# Patient Record
Sex: FEMALE | Race: WHITE | URBAN - METROPOLITAN AREA
[De-identification: names, ages, dates, MRNs, and addresses within clinical notes are randomized per-mention and may not be internally consistent; named-entity substitution may affect disease eponyms.]

---

## 2022-11-17 ENCOUNTER — HOSPITAL ENCOUNTER (EMERGENCY)
Age: 77
Discharge: HOME | End: 2022-11-17
Attending: EMERGENCY MEDICINE
Payer: MEDICARE

## 2022-11-17 VITALS
BODY MASS INDEX: 40.66 KG/M2 | OXYGEN SATURATION: 97 % | SYSTOLIC BLOOD PRESSURE: 137 MMHG | TEMPERATURE: 97.9 F | WEIGHT: 229.5 LBS | RESPIRATION RATE: 16 BRPM | HEART RATE: 108 BPM | HEIGHT: 63 IN | DIASTOLIC BLOOD PRESSURE: 64 MMHG

## 2022-11-17 DIAGNOSIS — D84.821 IMMUNOSUPPRESSION DUE TO DRUG THERAPY (HCC): Primary | ICD-10-CM

## 2022-11-17 DIAGNOSIS — Z79.899 IMMUNOSUPPRESSION DUE TO DRUG THERAPY (HCC): Primary | ICD-10-CM

## 2022-11-17 PROCEDURE — 6370000000 HC RX 637 (ALT 250 FOR IP): Performed by: EMERGENCY MEDICINE

## 2022-11-17 PROCEDURE — 99283 EMERGENCY DEPT VISIT LOW MDM: CPT

## 2022-11-17 RX ORDER — SULFAMETHOXAZOLE AND TRIMETHOPRIM 800; 160 MG/1; MG/1
1 TABLET ORAL NIGHTLY
Qty: 1 TABLET | Refills: 0 | Status: SHIPPED | OUTPATIENT
Start: 2022-11-17 | End: 2022-11-18

## 2022-11-17 RX ORDER — SULFAMETHOXAZOLE AND TRIMETHOPRIM 800; 160 MG/1; MG/1
1 TABLET ORAL ONCE
Status: COMPLETED | OUTPATIENT
Start: 2022-11-17 | End: 2022-11-17

## 2022-11-17 RX ADMIN — SULFAMETHOXAZOLE AND TRIMETHOPRIM 1 TABLET: 800; 160 TABLET ORAL at 21:42

## 2022-11-17 ASSESSMENT — LIFESTYLE VARIABLES: HOW OFTEN DO YOU HAVE A DRINK CONTAINING ALCOHOL: NEVER

## 2022-11-18 NOTE — DISCHARGE INSTRUCTIONS
Ms. Trell Ying was treated with a dose of Sulfamethoxazole-Trimethoprim in the emergency department. She has been provided with a prescription for another dose of this medication for tomorrow. It's fairly reasonable for her to be on PJP pneumonia prophylaxis as she is elderly, chronically ill, she appears to currently be prescribed 20 or more milligrams of Prednisone for > 1 month, and she is on another immunosuppressive medication (Methotrexate). However, this is a medication that can have significant side effects. Her primary doctor really needs to be the one prescribing it and monitoring her response to this therapy going forward. Be sure to contact her primary care doctor to arrange for a follow up visit and to discuss any ongoing prescribing of this medication. If Ms. Palomino develops any symptoms or  issues after leaving the hospital she is welcome back here for reevaluation at any time 24/7!

## 2022-11-19 NOTE — ED PROVIDER NOTES
[address] [place] [address] EMERGENCY DEPARTMENT    Name: Franck Hdez : 1945 MRN: [id number] Date of Service: 2022    /64   Pulse (!) 108   Temp 97.9 °F (36.6 °C)   Resp 16   Ht 5' 3\" (1.6 m)   Wt 229 lb 8 oz (104.1 kg)   SpO2 97%   BMI 40.65 kg/m²     CC: need for prescription medication    HPI: this patient is a 78 y.o. female presenting to the ED from [facility] via EMS. SNF staff called [phone] and requested for Ms. Palomino to be brought to the emergency department this evening as \"her doctor said that she needs to be prescribed something for PJP pneumnia prevention. \" We called to confirm with [facility] staff that this is Ms. Palomino's only acute issue or need. On arrival here Ms. Palomino is unable to provide additional H&P details as she has significant speech, communication, and cognitive deficits related to a prior hemorrhagic stroke. EHR review from [facility] healthcare systems shows that she was recently diagnosed with bullous pemphigoid and she is currently being treated with high-dose, daily Prednisone along with intermittent Methotrexate.  _____________________________________________________________________    Past Medical History:   Diagnosis Date    Class 3 severe obesity in adult [facility])     Hemorrhagic stroke ([facility] [address]     Hyperlipidemia     Hypertension     TIA (transient ischemic attack)     Type 2 diabetes mellitus ([facility] [address]        Past Surgical History:   Procedure Laterality Date    HYSTERECTOMY (CERVIX STATUS UNKNOWN)      TONSILLECTOMY         Social History     Tobacco Use    Smoking status: Unknown   Vaping Use    Vaping Use: Never used   Substance Use Topics    Alcohol use: Not Currently    Drug use: Not Currently       Family History   Problem Relation Age of Onset    Hypothyroidism Mother     Diabetes Mother     Elevated Lipids Mother      _____________________________________________________________________    Review of Systems   Unable to perform ROS: Other   Skin:  Positive for rash. Physical Exam  Constitutional:       General: She is awake. She is not in acute distress. Appearance: She is ill-appearing (chronically). She is not toxic-appearing or diaphoretic. HENT:      Head: Normocephalic and atraumatic. Eyes:      Conjunctiva/sclera: Conjunctivae normal.   Neck:      Vascular: No JVD. Cardiovascular:      Rate and Rhythm: Normal rate and regular rhythm. Heart sounds: Normal heart sounds. No murmur heard. Pulmonary:      Effort: Pulmonary effort is normal. No accessory muscle usage. Breath sounds: Normal breath sounds. Abdominal:      General: Bowel sounds are normal. There is no distension. Palpations: Abdomen is soft. Tenderness: There is no abdominal tenderness. There is no guarding or rebound. Skin:     General: Skin is warm and dry. Coloration: Skin is not cyanotic, mottled or pale. Findings: Rash present. Neurological:      Mental Status: She is alert. Mental status is at baseline. Psychiatric:         Speech: She is noncommunicative. Speech is slurred. Cognition and Memory: Cognition is impaired.     _____________________________________________________________________    Is this patient to be included in the SEP-1 Core Measure? No (no infection suspected)  _____________________________________________________________________    MEDICAL DECISION MAKING & PLANS:    I reviewed the patient's recent and/or pertinent records, current medications, nurses notes, allergies, and vital signs. Differential Diagnosis:  Immunosuppression related to medications    Treatments:  Medications   sulfamethoxazole-trimethoprim (BACTRIM DS;SEPTRA DS) 800-160 MG per tablet 1 tablet (1 tablet Oral Given 11/17/22 2142)     Disposition:  Ms. Shena Field is not acutely ill-appearing on my evaluation in the ED this evening.  As she is elderly, chronically ill, and it appears that she is going to be on a prolonged course of 20+ mg/day of Prednisone it is reasonable for her to be on PJP pneumonia Ppx. Will give her a dose of TMP/SMX now and prescribe one additional dose for her to receive tomorrow. Advised that as this is a medication that can have significant side effects her primary care provider or provider(s) at [facility] will need to manage this and/or other medications going forward and monitor her response to treatment. Return precautions reviewed in detail and outpatient follow up advised. _____________________________________________________________________    Clinical Impression(s)  1. Immunosuppression due to drug therapy [facility])        Provider Signature: MD Christopher Cunningham MD  11/19/22 3376